# Patient Record
Sex: MALE | Race: WHITE
[De-identification: names, ages, dates, MRNs, and addresses within clinical notes are randomized per-mention and may not be internally consistent; named-entity substitution may affect disease eponyms.]

---

## 2018-01-01 ENCOUNTER — HOSPITAL ENCOUNTER (EMERGENCY)
Dept: HOSPITAL 68 - ERH | Age: 58
End: 2018-01-01
Payer: COMMERCIAL

## 2018-01-01 VITALS — DIASTOLIC BLOOD PRESSURE: 65 MMHG | SYSTOLIC BLOOD PRESSURE: 118 MMHG

## 2018-01-01 VITALS — BODY MASS INDEX: 30.31 KG/M2 | HEIGHT: 68 IN | WEIGHT: 200 LBS

## 2018-01-01 DIAGNOSIS — I71.9: ICD-10-CM
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
## 2018-01-01 DIAGNOSIS — R07.89: ICD-10-CM

## 2018-01-01 DIAGNOSIS — S09.90XA: Primary | ICD-10-CM
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
## 2018-01-01 PROCEDURE — G0480 DRUG TEST DEF 1-7 CLASSES: HCPCS
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
## 2018-01-02 ENCOUNTER — HOSPITAL ENCOUNTER (EMERGENCY)
Dept: HOSPITAL 68 - ERH | Age: 58
Discharge: STILL ADMITTED OR ADMINISTRATIVE | End: 2018-01-02
Payer: COMMERCIAL

## 2018-01-02 VITALS — WEIGHT: 195 LBS | BODY MASS INDEX: 29.55 KG/M2 | HEIGHT: 68 IN

## 2018-01-02 VITALS — DIASTOLIC BLOOD PRESSURE: 84 MMHG | SYSTOLIC BLOOD PRESSURE: 145 MMHG

## 2018-01-02 DIAGNOSIS — R19.5: Primary | ICD-10-CM
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
## 2018-01-03 ENCOUNTER — HOSPITAL ENCOUNTER (EMERGENCY)
Dept: HOSPITAL 68 - ERH | Age: 58
End: 2018-01-03
Payer: COMMERCIAL

## 2018-01-03 VITALS — WEIGHT: 195 LBS | BODY MASS INDEX: 29.55 KG/M2 | HEIGHT: 68 IN

## 2018-01-03 VITALS — SYSTOLIC BLOOD PRESSURE: 142 MMHG | DIASTOLIC BLOOD PRESSURE: 93 MMHG

## 2018-01-03 DIAGNOSIS — R19.7: Primary | ICD-10-CM

## 2018-01-03 DIAGNOSIS — R22.1: ICD-10-CM

## 2018-01-03 LAB
ABSOLUTE GRANULOCYTE CT: 7.7 /CUMM (ref 1.4–6.5)
APTT BLD: 30 SEC (ref 25–37)
BASOPHILS # BLD: 0 /CUMM (ref 0–0.2)
BASOPHILS NFR BLD: 0.4 % (ref 0–2)
EOSINOPHIL # BLD: 0.2 /CUMM (ref 0–0.7)
EOSINOPHIL NFR BLD: 2 % (ref 0–5)
ERYTHROCYTE [DISTWIDTH] IN BLOOD BY AUTOMATED COUNT: 13.3 % (ref 11.5–14.5)
GRANULOCYTES NFR BLD: 64 % (ref 42.2–75.2)
HCT VFR BLD CALC: 46.2 % (ref 42–52)
LYMPHOCYTES # BLD: 3.2 /CUMM (ref 1.2–3.4)
MCH RBC QN AUTO: 33.2 PG (ref 27–31)
MCHC RBC AUTO-ENTMCNC: 34.5 G/DL (ref 33–37)
MCV RBC AUTO: 96.3 FL (ref 80–94)
MONOCYTES # BLD: 0.9 /CUMM (ref 0.1–0.6)
PLATELET # BLD: 197 /CUMM (ref 130–400)
PMV BLD AUTO: 9.8 FL (ref 7.4–10.4)
PROTHROMBIN TIME: 10.2 SEC (ref 9.4–12.5)
RED BLOOD CELL CT: 4.8 /CUMM (ref 4.7–6.1)
WBC # BLD AUTO: 12 /CUMM (ref 4.8–10.8)
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
## 2023-04-11 NOTE — CT SCAN REPORT
EXAMINATION:
CT ABDOMEN AND PELVIS WITH CONTRAST
 
CLINICAL INFORMATION:
Abnormal stooling. Abdominal pain.
 
COMPARISON:
None.
 
TECHNIQUE:
Contiguous axial thin section helical images of the abdomen and pelvis were
performed following the administration of 95 mL of intravenous Optiray 320.
The data set was reformatted in the coronal and sagittal planes and reviewed
on an independent workstation.
 
DLP: 351 mGy-cm.
 
FINDINGS:
The visualized lung bases are clear. The visualized portions of the heart are
unremarkable. Adjacent to the distal esophagus, anteriorly, on image 60/712
and 64/129 on the sagittal reconstruction, is a 19 mm soft tissue mass which
may be extrinsic
 
The liver is of normal size and attenuation without focal lesions nor
intrahepatic biliary ductal dilation. A normal gallbladder is identified.
There is no wall thickening or discernible pericholecystic fluid.
 
The spleen, pancreas, adrenal glands are unremarkable.
 
Both kidneys are of normal size and attenuation without hydronephrosis or
nephrolithiasis. Following the administration of IV contrast, prompt
symmetric nephrograms are displayed.
 
There is no abdominal free fluid. There is neither mesenteric nor
retroperitoneal lymphadenopathy.
 
Normal unopacified loops of small and large bowel are identified. There is no
pelvic free fluid. The urinary bladder is unremarkable. There is neither
pelvic nor inguinal lymphadenopathy. There are bilateral fat-containing
inguinal hernias.
 
Bone windows: Neither sclerotic nor lytic bone lesions are identified.
 
IMPRESSION:
 
No evidence for acute abdominal or pelvic inflammatory or infectious
processes.
 
19 mm soft tissue mass adjacent to the distal esophagus. It is uncertain as
to whether this is extrinsic or intramural. Consider correlation with
endoscopy for further characterization.
details…